# Patient Record
Sex: MALE | Race: OTHER | NOT HISPANIC OR LATINO | URBAN - METROPOLITAN AREA
[De-identification: names, ages, dates, MRNs, and addresses within clinical notes are randomized per-mention and may not be internally consistent; named-entity substitution may affect disease eponyms.]

---

## 2017-11-10 ENCOUNTER — INPATIENT (INPATIENT)
Facility: HOSPITAL | Age: 52
LOS: 1 days | Discharge: HOME | End: 2017-11-12
Attending: INTERNAL MEDICINE | Admitting: INTERNAL MEDICINE
Payer: COMMERCIAL

## 2017-11-10 VITALS
RESPIRATION RATE: 22 BRPM | OXYGEN SATURATION: 97 % | SYSTOLIC BLOOD PRESSURE: 180 MMHG | TEMPERATURE: 98 F | DIASTOLIC BLOOD PRESSURE: 93 MMHG | HEART RATE: 96 BPM

## 2017-11-10 DIAGNOSIS — J45.909 UNSPECIFIED ASTHMA, UNCOMPLICATED: ICD-10-CM

## 2017-11-10 LAB
ALBUMIN SERPL ELPH-MCNC: 4.4 G/DL — SIGNIFICANT CHANGE UP (ref 3.3–5)
ALP SERPL-CCNC: 82 U/L — SIGNIFICANT CHANGE UP (ref 40–120)
ALT FLD-CCNC: 48 U/L — HIGH (ref 4–41)
AST SERPL-CCNC: 64 U/L — HIGH (ref 4–40)
B PERT DNA SPEC QL NAA+PROBE: SIGNIFICANT CHANGE UP
BASE EXCESS BLDV CALC-SCNC: 2.7 MMOL/L — SIGNIFICANT CHANGE UP
BASOPHILS # BLD AUTO: 0.04 K/UL — SIGNIFICANT CHANGE UP (ref 0–0.2)
BASOPHILS NFR BLD AUTO: 0.3 % — SIGNIFICANT CHANGE UP (ref 0–2)
BILIRUB SERPL-MCNC: 0.4 MG/DL — SIGNIFICANT CHANGE UP (ref 0.2–1.2)
BLOOD GAS VENOUS - CREATININE: 0.86 MG/DL — SIGNIFICANT CHANGE UP (ref 0.5–1.3)
BUN SERPL-MCNC: 17 MG/DL — SIGNIFICANT CHANGE UP (ref 7–23)
C PNEUM DNA SPEC QL NAA+PROBE: NOT DETECTED — SIGNIFICANT CHANGE UP
CALCIUM SERPL-MCNC: 9.1 MG/DL — SIGNIFICANT CHANGE UP (ref 8.4–10.5)
CHLORIDE BLDV-SCNC: 108 MMOL/L — SIGNIFICANT CHANGE UP (ref 96–108)
CHLORIDE SERPL-SCNC: 105 MMOL/L — SIGNIFICANT CHANGE UP (ref 98–107)
CO2 SERPL-SCNC: 24 MMOL/L — SIGNIFICANT CHANGE UP (ref 22–31)
CREAT SERPL-MCNC: 0.97 MG/DL — SIGNIFICANT CHANGE UP (ref 0.5–1.3)
EOSINOPHIL # BLD AUTO: 0.56 K/UL — HIGH (ref 0–0.5)
EOSINOPHIL NFR BLD AUTO: 4.4 % — SIGNIFICANT CHANGE UP (ref 0–6)
FLUAV H1 2009 PAND RNA SPEC QL NAA+PROBE: NOT DETECTED — SIGNIFICANT CHANGE UP
FLUAV H1 RNA SPEC QL NAA+PROBE: NOT DETECTED — SIGNIFICANT CHANGE UP
FLUAV H3 RNA SPEC QL NAA+PROBE: NOT DETECTED — SIGNIFICANT CHANGE UP
FLUAV SUBTYP SPEC NAA+PROBE: SIGNIFICANT CHANGE UP
FLUBV RNA SPEC QL NAA+PROBE: NOT DETECTED — SIGNIFICANT CHANGE UP
GAS PNL BLDV: 139 MMOL/L — SIGNIFICANT CHANGE UP (ref 136–146)
GLUCOSE BLDV-MCNC: 111 — HIGH (ref 70–99)
GLUCOSE SERPL-MCNC: 111 MG/DL — HIGH (ref 70–99)
HADV DNA SPEC QL NAA+PROBE: NOT DETECTED — SIGNIFICANT CHANGE UP
HCO3 BLDV-SCNC: 26 MMOL/L — SIGNIFICANT CHANGE UP (ref 20–27)
HCOV 229E RNA SPEC QL NAA+PROBE: NOT DETECTED — SIGNIFICANT CHANGE UP
HCOV HKU1 RNA SPEC QL NAA+PROBE: NOT DETECTED — SIGNIFICANT CHANGE UP
HCOV NL63 RNA SPEC QL NAA+PROBE: NOT DETECTED — SIGNIFICANT CHANGE UP
HCOV OC43 RNA SPEC QL NAA+PROBE: NOT DETECTED — SIGNIFICANT CHANGE UP
HCT VFR BLD CALC: 41.7 % — SIGNIFICANT CHANGE UP (ref 39–50)
HCT VFR BLDV CALC: 44.8 % — SIGNIFICANT CHANGE UP (ref 39–51)
HGB BLD-MCNC: 13.9 G/DL — SIGNIFICANT CHANGE UP (ref 13–17)
HGB BLDV-MCNC: 14.6 G/DL — SIGNIFICANT CHANGE UP (ref 13–17)
HMPV RNA SPEC QL NAA+PROBE: NOT DETECTED — SIGNIFICANT CHANGE UP
HPIV1 RNA SPEC QL NAA+PROBE: NOT DETECTED — SIGNIFICANT CHANGE UP
HPIV2 RNA SPEC QL NAA+PROBE: NOT DETECTED — SIGNIFICANT CHANGE UP
HPIV3 RNA SPEC QL NAA+PROBE: NOT DETECTED — SIGNIFICANT CHANGE UP
HPIV4 RNA SPEC QL NAA+PROBE: NOT DETECTED — SIGNIFICANT CHANGE UP
IMM GRANULOCYTES # BLD AUTO: 0.06 # — SIGNIFICANT CHANGE UP
IMM GRANULOCYTES NFR BLD AUTO: 0.5 % — SIGNIFICANT CHANGE UP (ref 0–1.5)
LACTATE BLDV-MCNC: 1.1 MMOL/L — SIGNIFICANT CHANGE UP (ref 0.5–2)
LYMPHOCYTES # BLD AUTO: 18.7 % — SIGNIFICANT CHANGE UP (ref 13–44)
LYMPHOCYTES # BLD AUTO: 2.41 K/UL — SIGNIFICANT CHANGE UP (ref 1–3.3)
M PNEUMO DNA SPEC QL NAA+PROBE: NOT DETECTED — SIGNIFICANT CHANGE UP
MCHC RBC-ENTMCNC: 29.1 PG — SIGNIFICANT CHANGE UP (ref 27–34)
MCHC RBC-ENTMCNC: 33.3 % — SIGNIFICANT CHANGE UP (ref 32–36)
MCV RBC AUTO: 87.2 FL — SIGNIFICANT CHANGE UP (ref 80–100)
MONOCYTES # BLD AUTO: 0.98 K/UL — HIGH (ref 0–0.9)
MONOCYTES NFR BLD AUTO: 7.6 % — SIGNIFICANT CHANGE UP (ref 2–14)
NEUTROPHILS # BLD AUTO: 8.81 K/UL — HIGH (ref 1.8–7.4)
NEUTROPHILS NFR BLD AUTO: 68.5 % — SIGNIFICANT CHANGE UP (ref 43–77)
NRBC # FLD: 0 — SIGNIFICANT CHANGE UP
NT-PROBNP SERPL-SCNC: 31.35 PG/ML — SIGNIFICANT CHANGE UP
PCO2 BLDV: 52 MMHG — HIGH (ref 41–51)
PH BLDV: 7.35 PH — SIGNIFICANT CHANGE UP (ref 7.32–7.43)
PLATELET # BLD AUTO: 278 K/UL — SIGNIFICANT CHANGE UP (ref 150–400)
PMV BLD: 9.9 FL — SIGNIFICANT CHANGE UP (ref 7–13)
PO2 BLDV: 55 MMHG — HIGH (ref 35–40)
POTASSIUM BLDV-SCNC: 3.3 MMOL/L — LOW (ref 3.4–4.5)
POTASSIUM SERPL-MCNC: 3.5 MMOL/L — SIGNIFICANT CHANGE UP (ref 3.5–5.3)
POTASSIUM SERPL-SCNC: 3.5 MMOL/L — SIGNIFICANT CHANGE UP (ref 3.5–5.3)
PROT SERPL-MCNC: 7.4 G/DL — SIGNIFICANT CHANGE UP (ref 6–8.3)
RBC # BLD: 4.78 M/UL — SIGNIFICANT CHANGE UP (ref 4.2–5.8)
RBC # FLD: 13.3 % — SIGNIFICANT CHANGE UP (ref 10.3–14.5)
RSV RNA SPEC QL NAA+PROBE: NOT DETECTED — SIGNIFICANT CHANGE UP
RV+EV RNA SPEC QL NAA+PROBE: NOT DETECTED — SIGNIFICANT CHANGE UP
SAO2 % BLDV: 85.6 % — HIGH (ref 60–85)
SODIUM SERPL-SCNC: 143 MMOL/L — SIGNIFICANT CHANGE UP (ref 135–145)
WBC # BLD: 12.86 K/UL — HIGH (ref 3.8–10.5)
WBC # FLD AUTO: 12.86 K/UL — HIGH (ref 3.8–10.5)

## 2017-11-10 PROCEDURE — 71020: CPT | Mod: 26

## 2017-11-10 RX ORDER — IPRATROPIUM/ALBUTEROL SULFATE 18-103MCG
3 AEROSOL WITH ADAPTER (GRAM) INHALATION EVERY 6 HOURS
Qty: 0 | Refills: 0 | Status: DISCONTINUED | OUTPATIENT
Start: 2017-11-10 | End: 2017-11-12

## 2017-11-10 RX ORDER — IPRATROPIUM/ALBUTEROL SULFATE 18-103MCG
3 AEROSOL WITH ADAPTER (GRAM) INHALATION ONCE
Qty: 0 | Refills: 0 | Status: COMPLETED | OUTPATIENT
Start: 2017-11-10 | End: 2017-11-10

## 2017-11-10 RX ORDER — ALBUTEROL 90 UG/1
1 AEROSOL, METERED ORAL EVERY 4 HOURS
Qty: 0 | Refills: 0 | Status: DISCONTINUED | OUTPATIENT
Start: 2017-11-10 | End: 2017-11-10

## 2017-11-10 RX ORDER — ALBUTEROL 90 UG/1
2.5 AEROSOL, METERED ORAL ONCE
Qty: 0 | Refills: 0 | Status: COMPLETED | OUTPATIENT
Start: 2017-11-10 | End: 2017-11-10

## 2017-11-10 RX ORDER — LORATADINE 10 MG/1
10 TABLET ORAL DAILY
Qty: 0 | Refills: 0 | Status: DISCONTINUED | OUTPATIENT
Start: 2017-11-10 | End: 2017-11-12

## 2017-11-10 RX ORDER — MAGNESIUM SULFATE 500 MG/ML
2 VIAL (ML) INJECTION ONCE
Qty: 0 | Refills: 0 | Status: COMPLETED | OUTPATIENT
Start: 2017-11-10 | End: 2017-11-10

## 2017-11-10 RX ORDER — ALBUTEROL 90 UG/1
2 AEROSOL, METERED ORAL EVERY 4 HOURS
Qty: 0 | Refills: 0 | Status: DISCONTINUED | OUTPATIENT
Start: 2017-11-10 | End: 2017-11-12

## 2017-11-10 RX ORDER — ENOXAPARIN SODIUM 100 MG/ML
40 INJECTION SUBCUTANEOUS EVERY 24 HOURS
Qty: 0 | Refills: 0 | Status: DISCONTINUED | OUTPATIENT
Start: 2017-11-10 | End: 2017-11-12

## 2017-11-10 RX ORDER — SODIUM CHLORIDE 9 MG/ML
1000 INJECTION INTRAMUSCULAR; INTRAVENOUS; SUBCUTANEOUS ONCE
Qty: 0 | Refills: 0 | Status: COMPLETED | OUTPATIENT
Start: 2017-11-10 | End: 2017-11-10

## 2017-11-10 RX ADMIN — Medication 50 GRAM(S): at 03:53

## 2017-11-10 RX ADMIN — LORATADINE 10 MILLIGRAM(S): 10 TABLET ORAL at 15:11

## 2017-11-10 RX ADMIN — ENOXAPARIN SODIUM 40 MILLIGRAM(S): 100 INJECTION SUBCUTANEOUS at 15:11

## 2017-11-10 RX ADMIN — Medication 3 MILLILITER(S): at 11:48

## 2017-11-10 RX ADMIN — Medication 125 MILLIGRAM(S): at 06:17

## 2017-11-10 RX ADMIN — Medication 200 MILLIGRAM(S): at 21:55

## 2017-11-10 RX ADMIN — ALBUTEROL 2.5 MILLIGRAM(S): 90 AEROSOL, METERED ORAL at 06:17

## 2017-11-10 RX ADMIN — Medication 200 MILLIGRAM(S): at 15:11

## 2017-11-10 RX ADMIN — Medication 3 MILLILITER(S): at 03:53

## 2017-11-10 RX ADMIN — Medication 100 MILLIGRAM(S): at 06:17

## 2017-11-10 RX ADMIN — SODIUM CHLORIDE 1000 MILLILITER(S): 9 INJECTION INTRAMUSCULAR; INTRAVENOUS; SUBCUTANEOUS at 04:30

## 2017-11-10 RX ADMIN — Medication 100 MILLIGRAM(S): at 21:55

## 2017-11-10 RX ADMIN — ALBUTEROL 2 PUFF(S): 90 AEROSOL, METERED ORAL at 15:11

## 2017-11-10 RX ADMIN — Medication 3 MILLILITER(S): at 18:54

## 2017-11-10 RX ADMIN — Medication 3 MILLILITER(S): at 23:56

## 2017-11-10 NOTE — ED PROVIDER NOTE - ATTENDING CONTRIBUTION TO CARE
[place]: 52 yom with exercise induced asthma c/o SOb for 10 days. seen in urgent care twice and given antibxs and steroids with no improvement.  No fever, +cough, no sputum, no cp, sob worse with exertion,, no travel, no leg swelling, no sore throat.  On exam, pt is well appearing, +mild to mod resp distress, diffuse wheeze, not tachy, 97% RA, 91% with exertion, abd soft, no pitting edema, no calf tn, cxr unremarkable. sxs improved with nebs and steroids and mag but short lived.  PCU admit.

## 2017-11-10 NOTE — ED ADULT NURSE NOTE - OBJECTIVE STATEMENT
break coverage-pt received spot 12, alert and orientedx3 c.o sob and wheezing x a few months, worsening this week. was seen at [facility] with no relief. denies cp dizziness nausea. currently on NRB, respirations labored. iv placed, labs sent. md at bedside. will monitor.

## 2017-11-10 NOTE — ED PROVIDER NOTE - MEDICAL DECISION MAKING DETAILS
53 y/o M with PMH seasonal allergies p/w SOB and wheezing. likely asthma exacerbation w/ prolonged expiratory phase. cbc, cmp, cxr, duonebs, mag, rvp, vbg.

## 2017-11-10 NOTE — ED ADULT NURSE REASSESSMENT NOTE - NS ED NURSE REASSESS COMMENT FT1
Pt. vital signs as stated. Pt. is resting comfortably. Denies chest pain, n/v, dizziness, lightheadedness. c/o occasional cough that brings on SOB. Awaiting bed in PCU. Sating at 98% on 3l NC. Will continue to monitor.

## 2017-11-10 NOTE — H&P ADULT - ASSESSMENT
----------------------------------------  Ann-Marie Thrasher MD PGY-4  Pulmonary/Critical Care Fellow  Pager # [phone] (NS), [phone] ([facility]) 52M hx seasonal allergies, Vit D deficiency and herniated disc, who p/w SOB and cough for past 10 days.    #SOB/Cough  Pt c/o SOB/nonproductive cough for the past 10 days. Initially started with subjective fevers, which resolved with OTC NSAID/tylenol. Family has been sick as well. RVP negative now though may have been infected over a week ago. Has been getting steroids as outpatient but inappropriate dosages and being treated as PNA though no radiological evidence of PNA. On labs has mild leukocytosis, which is likely secondary to steroid use, and also found to have CO2 52 with mild hypoxia. Currently improved after nebulizer treatments and solumedrol. No history of asthma, so hesitant to call this asthma exacerbation. Most likely Reactive Airway Disease from probable viral URI 10 days ago with lingering bronchospasms causing wheezing, SOB, and cough.  - duonebs q6h, albuterol prn  - prednisone 40 mg qd   - consider starting symbicort, though pt does not carry asthma diagnosis  - antitussives: tessalon perles, guaifenesin  - no need for antibiotics at this time  - will need outpatient PFTs  - monitor O2 Sat, maintain > 90%  - repeat ABG tomorrow, can get bilevel if increased WOB but no need at this time  - f/u CBC  - low threshold to culture     #ppx  - DVT: Lovenox  - home when medically stable for discharge    Case to be d/w attending    ----------------------------------------  Ann-Marie Thrasher MD PGY-4  Pulmonary/Critical Care Fellow  Pager # [phone] (NS), [phone] ([facility])

## 2017-11-10 NOTE — H&P ADULT - NSHPSOCIALHISTORY_GEN_ALL_CORE
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

## 2017-11-10 NOTE — ED PROVIDER NOTE - OBJECTIVE STATEMENT
51 y/o M with PMH seasonal allergies p/w SOB and wheezing. PT states he started with a cough on nov 2nd and was started on erythromycin.  Pt. was recently seen in urgent care for similar symptoms was given prednisone which he completed but continued to have symptoms. yesterday he was seen by urgent care and was started on augmentin and , methylprednisone which he took  24mg today, and albuterol. He states he went home and has continued to have SOB and nonproductive cough. He states he currently has SOB worse with exertion and wheezing. He states his children and sick with bronchitis. he denies current fever, chest pain, diarrhea, dysuria, recent travel

## 2017-11-10 NOTE — H&P ADULT - NSHPPHYSICALEXAM_GEN_ALL_CORE
General: NAD, pleasant  HEENT: NCAT, moist mucosal membranes; no JVD appreciated  Resp: mild insp wheezing, speaking in short sentences, no accessory muscle  Cards: S1/S2 normal, RRR, no murmurs/rubs/gallops appreciated  Abd: soft, non-tender, non-distended with normal bowel sounds  Ext: no b/l LE edema  Skin: warm/dry  Neuro: AAOx3, no focal deficits

## 2017-11-10 NOTE — H&P ADULT - HISTORY OF PRESENT ILLNESS
52M hx seasonal allergies, Vit D deficiency and herniated disc, who p/w SOB and cough for past 10 days. 2 weeks ago was feeling in his usual state of health.  States that his family has been sick over the last week with bronchitis and viral URIs.     In the ED, VS Tm 98, -180/, HR 83-96, RR 20-22, O2 Sat 98% on 2L NC. Given nebulizer x1, 1L NS, duonebs x3, solumedrol 125 mg IV x1, and Mag 52M hx seasonal allergies, Vit D deficiency and herniated disc, who p/w SOB and cough for past 10 days. Two weeks ago was feeling in his usual state of health but then developed a subjective fever, which resolved with tylenol and motrin. However patient then developed non-productive cough with SOB/CASTILLO. Over the past 10 days has seen 3 doctors. On 11/2, he went to urgent care, where they gave him erythromycin and albuterol inhaler, which pt states was not helpful (though found his son's albuterol nebulizer helpful for his SOB). Then returned to urgent care on 11/6 where he was given a brief prednisone taper of (30 --> 20 --> 10) and another albuterol inhaler. After his last prednisone dose on 11/9, he continued to be symptomatic and went to clinic on 11/9, where CXR was done without evidence of pneumonia. In the clinic, he was given a nebulizer treatment and prescribed meylprednisolone dose pack and augmentin bid x 10 days. Pt has only taken 24 mg of methylprednisolone so far but has required albuterol multiple times for symptomatic relief. Denies chills, chest pain, abdominal pain, nauysea/vomiting, diarrhea, hemoptysis, weight loss, night sweats, b/l LE edema, sore throat, rhinorrhea, nasal congestion, itchy throat. States that his family has been sick over the last week with bronchitis and viral URIs. Patient has no history of asthma. Used to smoke 10-12 cig/day x 10 years but quit in 1988. 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 Because of persistent symptoms, he came to the ED.     In the ED, VS Tm 98, -180/, HR 83-96, RR 20-22, O2 Sat 98% on 2L NC. Given nebulizer x1, 1L NS, duonebs x3, solumedrol 125 mg IV x1, and Magnesium IV. Pt is feeling that symptoms have improved somewhat.

## 2017-11-10 NOTE — ED ADULT NURSE NOTE - CHIEF COMPLAINT QUOTE
Pt arrives to ER c/o wheezing and SOB. Pt was seen [facility] for [facility] on [facility]/4 and 11/6 and states he received steroids but they didn't work. Today he was seen at the clinic for same and received an Rx for methylprednisolone and Amox/Xjpy695/125 of which he took 1 dose but states SOB continues and the medication is not working. Pt c/o pain only during coughing.

## 2017-11-10 NOTE — ED ADULT TRIAGE NOTE - CHIEF COMPLAINT QUOTE
Pt arrives to ER c/o wheezing and SOB. Pt was seen at [facility] for [facility] on 11/4 and 11/6 and states he received steroids but they didn't work. Today he was seen at the clinic for same and received an Rx for methylprednisolone and Amox/Opur002/125 of which he took 1 dose but states SOB continues and the medication is not working. Pt c/o pain only during coughing.

## 2017-11-10 NOTE — ED ADULT NURSE REASSESSMENT NOTE - NS ED NURSE REASSESS COMMENT FT1
Pt. vital signs as stated. Pt. appears in NAD, received nebulizer tx, breathing even and unlabored. Awaiting bed. Will continue to monitor.

## 2017-11-10 NOTE — H&P ADULT - NSHPLABSRESULTS_GEN_ALL_CORE
13.9   12.86 )-----------( 278      ( 10 Nov 2017 03:40 )             41.7     Hgb Trend: 13.9<--  11-10    143  |  105  |  17  ----------------------------<  111<H>  3.5   |  24  |  0.97    Ca    9.1      10 Nov 2017 03:40    TPro  7.4  /  Alb  4.4  /  TBili  0.4  /  DBili  x   /  AST  64<H>  /  ALT  48<H>  /  AlkPhos  82  11-10    Creatinine Trend: 0.97<--      Venous Blood Gas:  11-10 @ 03:40  7.35/52/55/26/85.6  VBG Lactate: 1.1      MICROBIOLOGY:   RVP neg    RADIOLOGY:  CXR: clear

## 2017-11-10 NOTE — ED ADULT NURSE REASSESSMENT NOTE - NS ED NURSE REASSESS COMMENT FT1
pt medicated for SOB- pt now comfortable sleeping in bed Spo2 98% on 2L o2 nasal cannula- pt in NAD will continue to monitor.

## 2017-11-11 ENCOUNTER — TRANSCRIPTION ENCOUNTER (OUTPATIENT)
Age: 52
End: 2017-11-11

## 2017-11-11 DIAGNOSIS — R06.02 SHORTNESS OF BREATH: ICD-10-CM

## 2017-11-11 DIAGNOSIS — Z29.9 ENCOUNTER FOR PROPHYLACTIC MEASURES, UNSPECIFIED: ICD-10-CM

## 2017-11-11 LAB
BASOPHILS # BLD AUTO: 0.03 K/UL — SIGNIFICANT CHANGE UP (ref 0–0.2)
BASOPHILS NFR BLD AUTO: 0.2 % — SIGNIFICANT CHANGE UP (ref 0–2)
BUN SERPL-MCNC: 17 MG/DL — SIGNIFICANT CHANGE UP (ref 7–23)
CALCIUM SERPL-MCNC: 8.9 MG/DL — SIGNIFICANT CHANGE UP (ref 8.4–10.5)
CHLORIDE SERPL-SCNC: 103 MMOL/L — SIGNIFICANT CHANGE UP (ref 98–107)
CO2 SERPL-SCNC: 27 MMOL/L — SIGNIFICANT CHANGE UP (ref 22–31)
CREAT SERPL-MCNC: 0.91 MG/DL — SIGNIFICANT CHANGE UP (ref 0.5–1.3)
EOSINOPHIL # BLD AUTO: 0.05 K/UL — SIGNIFICANT CHANGE UP (ref 0–0.5)
EOSINOPHIL NFR BLD AUTO: 0.3 % — SIGNIFICANT CHANGE UP (ref 0–6)
GLUCOSE SERPL-MCNC: 113 MG/DL — HIGH (ref 70–99)
HCT VFR BLD CALC: 39.5 % — SIGNIFICANT CHANGE UP (ref 39–50)
HGB BLD-MCNC: 13.2 G/DL — SIGNIFICANT CHANGE UP (ref 13–17)
IMM GRANULOCYTES # BLD AUTO: 0.05 # — SIGNIFICANT CHANGE UP
IMM GRANULOCYTES NFR BLD AUTO: 0.3 % — SIGNIFICANT CHANGE UP (ref 0–1.5)
LYMPHOCYTES # BLD AUTO: 17.5 % — SIGNIFICANT CHANGE UP (ref 13–44)
LYMPHOCYTES # BLD AUTO: 2.59 K/UL — SIGNIFICANT CHANGE UP (ref 1–3.3)
MCHC RBC-ENTMCNC: 29.1 PG — SIGNIFICANT CHANGE UP (ref 27–34)
MCHC RBC-ENTMCNC: 33.4 % — SIGNIFICANT CHANGE UP (ref 32–36)
MCV RBC AUTO: 87.2 FL — SIGNIFICANT CHANGE UP (ref 80–100)
MONOCYTES # BLD AUTO: 1.25 K/UL — HIGH (ref 0–0.9)
MONOCYTES NFR BLD AUTO: 8.4 % — SIGNIFICANT CHANGE UP (ref 2–14)
NEUTROPHILS # BLD AUTO: 10.84 K/UL — HIGH (ref 1.8–7.4)
NEUTROPHILS NFR BLD AUTO: 73.3 % — SIGNIFICANT CHANGE UP (ref 43–77)
NRBC # FLD: 0 — SIGNIFICANT CHANGE UP
PLATELET # BLD AUTO: 260 K/UL — SIGNIFICANT CHANGE UP (ref 150–400)
PMV BLD: 9.8 FL — SIGNIFICANT CHANGE UP (ref 7–13)
POTASSIUM SERPL-MCNC: 3.6 MMOL/L — SIGNIFICANT CHANGE UP (ref 3.5–5.3)
POTASSIUM SERPL-SCNC: 3.6 MMOL/L — SIGNIFICANT CHANGE UP (ref 3.5–5.3)
RBC # BLD: 4.53 M/UL — SIGNIFICANT CHANGE UP (ref 4.2–5.8)
RBC # FLD: 13.4 % — SIGNIFICANT CHANGE UP (ref 10.3–14.5)
SODIUM SERPL-SCNC: 140 MMOL/L — SIGNIFICANT CHANGE UP (ref 135–145)
WBC # BLD: 14.81 K/UL — HIGH (ref 3.8–10.5)
WBC # FLD AUTO: 14.81 K/UL — HIGH (ref 3.8–10.5)

## 2017-11-11 PROCEDURE — 99223 1ST HOSP IP/OBS HIGH 75: CPT | Mod: GC

## 2017-11-11 RX ORDER — BUDESONIDE, MICRONIZED 100 %
1 POWDER (GRAM) MISCELLANEOUS
Qty: 1 | Refills: 0 | OUTPATIENT
Start: 2017-11-11 | End: 2017-12-11

## 2017-11-11 RX ORDER — BUDESONIDE, MICRONIZED 100 %
1 POWDER (GRAM) MISCELLANEOUS
Qty: 0 | Refills: 0 | Status: DISCONTINUED | OUTPATIENT
Start: 2017-11-11 | End: 2017-11-12

## 2017-11-11 RX ORDER — ALBUTEROL 90 UG/1
2 AEROSOL, METERED ORAL
Qty: 1 | Refills: 0 | OUTPATIENT
Start: 2017-11-11 | End: 2017-12-11

## 2017-11-11 RX ORDER — LORATADINE 10 MG/1
1 TABLET ORAL
Qty: 30 | Refills: 0 | OUTPATIENT
Start: 2017-11-11 | End: 2017-12-11

## 2017-11-11 RX ADMIN — Medication 200 MILLIGRAM(S): at 21:54

## 2017-11-11 RX ADMIN — Medication 40 MILLIGRAM(S): at 05:42

## 2017-11-11 RX ADMIN — Medication 200 MILLIGRAM(S): at 05:42

## 2017-11-11 RX ADMIN — Medication 3 MILLILITER(S): at 16:26

## 2017-11-11 RX ADMIN — LORATADINE 10 MILLIGRAM(S): 10 TABLET ORAL at 12:03

## 2017-11-11 RX ADMIN — Medication 200 MILLIGRAM(S): at 12:07

## 2017-11-11 RX ADMIN — Medication 3 MILLILITER(S): at 09:51

## 2017-11-11 RX ADMIN — Medication 100 MILLIGRAM(S): at 21:54

## 2017-11-11 RX ADMIN — Medication 100 MILLIGRAM(S): at 05:42

## 2017-11-11 RX ADMIN — Medication 3 MILLILITER(S): at 22:31

## 2017-11-11 NOTE — DISCHARGE NOTE ADULT - MEDICATION SUMMARY - MEDICATIONS TO STOP TAKING
I will STOP taking the medications listed below when I get home from the hospital:    Augmentin 875 mg-125 mg oral tablet  -- 1 tab(s) by mouth every 12 hours x 10 days total    MethylPREDNISolone Dose Pack 4 mg oral tablet

## 2017-11-11 NOTE — DISCHARGE NOTE ADULT - MEDICATION SUMMARY - MEDICATIONS TO TAKE
I will START or STAY ON the medications listed below when I get home from the hospital:    budesonide 90 mcg/inh inhalation powder  -- 1 puff(s) inhaled 2 times a day   -- Indication: For Shortness of breath    predniSONE 10 mg oral tablet  -- 4 tab(s) PO QD x 2 days  3 tab(s) by mouth once a day x 2 days  2 tab(s) by mouth once a day x 2 days  1 tab(s) by mouth once a day x 2 day  -- It is very important that you take or use this exactly as directed.  Do not skip doses or discontinue unless directed by your doctor.  Obtain medical advice before taking any non-prescription drugs as some may affect the action of this medication.  Take with food or milk.    -- Indication: For Shortness of breath    loratadine 10 mg oral tablet  -- 1 tab(s) by mouth once a day  -- Indication: For Seasonal allergies    benzonatate 100 mg oral capsule  -- 1 cap(s) by mouth 3 times a day, As needed, Cough  -- Indication: For Shortness of breath    Ventolin HFA 90 mcg/inh inhalation aerosol  -- 2 puff(s) inhaled 4 times a day, As Needed -for shortness of breath and/or wheezing   -- Indication: For Shortness of breath    guaiFENesin 100 mg/5 mL oral liquid  -- 10 milliliter(s) by mouth every 6 hours, As needed, Cough  -- Indication: For Shortness of breath    Coenzyme Q10 10 mg oral capsule  -- Indication: For Vitamins    Vitamin D3 1000 intl units oral tablet  -- 1 tab(s) by mouth once a day  -- Indication: For Vitamins I will START or STAY ON the medications listed below when I get home from the hospital:    budesonide 90 mcg/inh inhalation powder  -- 1 puff(s) inhaled 2 times a day   -- Indication: For Shortness of breath    predniSONE 10 mg oral tablet  -- 3 tab(s) by mouth once a day x 3 days  2 tab(s) by mouth once a day x 3 days  1 tab(s) by mouth once a day x 3 da  -- It is very important that you take or use this exactly as directed.  Do not skip doses or discontinue unless directed by your doctor.  Obtain medical advice before taking any non-prescription drugs as some may affect the action of this medication.  Take with food or milk.    -- Indication: For Shortness of breath    loratadine 10 mg oral tablet  -- 1 tab(s) by mouth once a day  -- Indication: For Seasonal allergies    benzonatate 100 mg oral capsule  -- 1 cap(s) by mouth 3 times a day, As needed, Cough  -- Indication: For Shortness of breath    Ventolin HFA 90 mcg/inh inhalation aerosol  -- 2 puff(s) inhaled 4 times a day, As Needed -for shortness of breath and/or wheezing   -- Indication: For Shortness of breath    guaiFENesin 100 mg/5 mL oral liquid  -- 10 milliliter(s) by mouth every 6 hours, As needed, Cough  -- Indication: For Shortness of breath    Coenzyme Q10 10 mg oral capsule  -- Indication: For Vitamins    pantoprazole 40 mg oral delayed release tablet  -- 1 tab(s) by mouth once a day (before a meal)  -- Indication: For Gerd     Vitamin D3 1000 intl units oral tablet  -- 1 tab(s) by mouth once a day  -- Indication: For Vitamins

## 2017-11-11 NOTE — PROGRESS NOTE ADULT - ASSESSMENT
52M hx seasonal allergies, Vit D deficiency and herniated disc, who p/w SOB and cough for past 10 days.    #SOB/Cough  Pt c/o SOB/nonproductive cough for the past 10 days. Initially started with subjective fevers, which resolved with OTC NSAID/tylenol. Family has been sick as well. RVP negative now though may have been infected over a week ago. Has been getting steroids as outpatient but inappropriate dosages and being treated as PNA though no radiological evidence of PNA. On labs has mild leukocytosis, which is likely secondary to steroid use, and also found to have CO2 52 with mild hypoxia. Currently improved after nebulizer treatments and solumedrol. No history of asthma, so hesitant to call this asthma exacerbation. Most likely Reactive Airway Disease from probable viral URI 10 days ago with lingering bronchospasms causing wheezing, SOB, and cough.  - duonebs q6h, albuterol prn  - prednisone 40 mg qd   - consider starting symbicort, though pt does not carry asthma diagnosis  - antitussives: tessalon perles, guaifenesin  - no need for antibiotics at this time  - will need outpatient PFTs  - monitor O2 Sat, maintain > 90%  - repeat ABG tomorrow, can get bilevel if increased WOB but no need at this time  - f/u CBC  - low threshold to culture     #ppx  - DVT: Lovenox  - home when medically stable for discharge    Case to be d/w attending    ----------------------------------------  Ann-Marie Thrasher MD PGY-4  Pulmonary/Critical Care Fellow  Pager # [phone] (NS), [phone] ([facility]) 52M hx seasonal allergies, Vit D deficiency and herniated disc, who p/w SOB and cough for past 10 days. 52M hx environmental allergies (for which he receives injections), Vit D deficiency and herniated disc, who p/w SOB and cough for past 10 days - likely post infectious cough with a component of bronchospasm/reactive airways disease with history of "Asthma" diagnosed more than a decade ago without acute exacerbation until this past 2 weeks.

## 2017-11-11 NOTE — PROGRESS NOTE ADULT - PROBLEM SELECTOR PLAN 1
- likely 2/2 reactive airway disease, pt without PFTs to confirm dx of asthma  - off oxygen at this time and tolerating room air  - would continue with albuterol  - cont with claritin  - cont pred, with short taper upon discharge  - add pulmicort  - will need outpt follow up for PFTs   - monitor respiratory status

## 2017-11-11 NOTE — DISCHARGE NOTE ADULT - CARE PLAN
Principal Discharge DX:	Shortness of breath  Goal:	resolution of symptoms, management  Instructions for follow-up, activity and diet:	Your shortness of breath has improved. Your chest xray was clear.   Please continue with medications as directed.  Please follow up with pulmonology office for hospital follow up within 2-3 weeks of discharge - you need pulmonary function tests (PFTs) and further follow up.  Please call the office for an appointment. Principal Discharge DX:	Shortness of breath  Goal:	resolution of symptoms, management  Instructions for follow-up, activity and diet:	Your shortness of breath has improved. Your chest x-ray was clear.   Please continue with medications as directed.  Please follow up with pulmonology office for hospital follow up within 2-3 weeks of discharge - you need pulmonary function tests (PFTs) and further follow up.  Please call the office for an appointment.

## 2017-11-11 NOTE — PROGRESS NOTE ADULT - SUBJECTIVE AND OBJECTIVE BOX
CHIEF COMPLAINT: Patient is a 52y old  Male who presents with a chief complaint of SOB, cough (10 Nov 2017 11:01)    Interval Events:      REVIEW OF SYSTEMS:  Constitutional:   Eyes:  ENT:  CV:  Resp:  GI:  :  MSK:  Integumentary:  Neurological:  Psychiatric:  Endocrine:  Hematologic/Lymphatic:  Allergic/Immunologic:  [ ] All other systems negative  [ ] Unable to assess ROS because ________      OBJECTIVE:  ICU Vital Signs Last 24 Hrs  T(C): 36.9 (11 Nov 2017 05:40), Max: 36.9 (11 Nov 2017 05:40)  T(F): 98.4 (11 Nov 2017 05:40), Max: 98.4 (11 Nov 2017 05:40)  HR: 88 (11 Nov 2017 05:40) (78 - 97)  BP: 123/79 (11 Nov 2017 05:40) (123/79 - 139/80)  BP(mean): --  ABP: --  ABP(mean): --  RR: 18 (11 Nov 2017 05:40) (17 - 18)  SpO2: 97% (11 Nov 2017 05:40) (95% - 100%)      HOSPITAL MEDICATIONS:  MEDICATIONS  (STANDING):  ALBUTerol/ipratropium for Nebulization 3 milliLiter(s) Nebulizer every 6 hours  enoxaparin Injectable 40 milliGRAM(s) SubCutaneous every 24 hours  loratadine 10 milliGRAM(s) Oral daily  predniSONE   Tablet 40 milliGRAM(s) Oral daily    MEDICATIONS  (PRN):  ALBUTerol    90 MICROgram(s) HFA Inhaler 2 Puff(s) Inhalation every 4 hours PRN Shortness of Breath and/or Wheezing  benzonatate 100 milliGRAM(s) Oral three times a day PRN Cough  guaiFENesin    Syrup 200 milliGRAM(s) Oral every 6 hours PRN Cough      LABS:                        13.2   14.81 )-----------( 260      ( 11 Nov 2017 05:40 )             39.5     11-11    140  |  103  |  17  ----------------------------<  113<H>  3.6   |  27  |  0.91    Ca    8.9      11 Nov 2017 05:40    TPro  7.4  /  Alb  4.4  /  TBili  0.4  /  DBili  x   /  AST  64<H>  /  ALT  48<H>  /  AlkPhos  82  11-10      Venous Blood Gas:  11-10 @ 03:40  7.35/52/55/26/85.6  VBG Lactate: 1.1      Rapid Respiratory Viral Panel (11.10.17 @ 03:40)    Adenovirus (RapRVP): NOT DETECTED    Influenza A (RapRVP): NOT DETECTED (any subtype)    Influenza AH1 2009 (RapRVP): NOT DETECTED    Influenza AH1 (RapRVP): NOT DETECTED    Influenza AH3 (RapRVP): NOT DETECTED    Influenza B (RapRVP): NOT DETECTED    Parainfluenza 1 (RapRVP): NOT DETECTED    Parainfluenza 2 (RapRVP): NOT DETECTED    Parainfluenza 3 (RapRVP): NOT DETECTED    Parainfluenza 4 (RapRVP): NOT DETECTED    Resp Syncytial Virus (RapRVP): NOT DETECTED    Bordetella pertussis (RapRVP): NOT DETECTED    Chlamydia pneumoniae (RapRVP): NOT DETECTED    Mycoplasma pneumoniae (RapRVP): NOT DETECTED This nucleic acid amplification assay was performed using  the [employer] Film[employer]. The following pathogens are tested  for: Adenovirus, Coronavirus 229E, Coronavirus HKU1,  Coronavirus NL63, Coronavirus OC43, Human Metapneumovirus  (HMPV), Rhinovirus/Enterovirus, Influenza A H1, Influenza A  H1 2009 (Pandemic H1 2009), Influenza A H3, Influenza A (Flu  A) subtype not identified, Influenza B, Parainfluenza Virus  (types 1, 2, 3, 4), Respiratory Syncytial Virus (RSV),  Bordetella pertussis, Chlamydophila pneumoniae, and  Mycoplasma pneumoniae. A negative FilmArray result does not  always exclude the possibility of viral or bacterial  infection. Laboratory results should always be interpreted  in the context of clinical findings.    Entero/Rhinovirus (RapRVP): NOT DETECTED    HKU1 Coronavirus (RapRVP): NOT DETECTED    NL63 Coronavirus (RapRVP): NOT DETECTED    229E Coronavirus (RapRVP): NOT DETECTED    OC43 Coronavirus (RapRVP): NOT DETECTED    hMPV (RapRVP): NOT DETECTED CHIEF COMPLAINT: Patient is a 52y old  Male who presents with a chief complaint of SOB, cough (10 Nov 2017 11:01)    Interval Events: admitted overnight      REVIEW OF SYSTEMS:  CV: denies  Resp: denies  GI: denies  [x All other systems negative  [ ] Unable to assess ROS because ________      OBJECTIVE:  ICU Vital Signs Last 24 Hrs  T(C): 36.9 (11 Nov 2017 05:40), Max: 36.9 (11 Nov 2017 05:40)  T(F): 98.4 (11 Nov 2017 05:40), Max: 98.4 (11 Nov 2017 05:40)  HR: 88 (11 Nov 2017 05:40) (78 - 97)  BP: 123/79 (11 Nov 2017 05:40) (123/79 - 139/80)  BP(mean): --  ABP: --  ABP(mean): --  RR: 18 (11 Nov 2017 05:40) (17 - 18)  SpO2: 97% (11 Nov 2017 05:40) (95% - 100%)      HOSPITAL MEDICATIONS:  MEDICATIONS  (STANDING):  ALBUTerol/ipratropium for Nebulization 3 milliLiter(s) Nebulizer every 6 hours  enoxaparin Injectable 40 milliGRAM(s) SubCutaneous every 24 hours  loratadine 10 milliGRAM(s) Oral daily  predniSONE   Tablet 40 milliGRAM(s) Oral daily    MEDICATIONS  (PRN):  ALBUTerol    90 MICROgram(s) HFA Inhaler 2 Puff(s) Inhalation every 4 hours PRN Shortness of Breath and/or Wheezing  benzonatate 100 milliGRAM(s) Oral three times a day PRN Cough  guaiFENesin    Syrup 200 milliGRAM(s) Oral every 6 hours PRN Cough      LABS:                        13.2   14.81 )-----------( 260      ( 11 Nov 2017 05:40 )             39.5     11-11    140  |  103  |  17  ----------------------------<  113<H>  3.6   |  27  |  0.91    Ca    8.9      11 Nov 2017 05:40    TPro  7.4  /  Alb  4.4  /  TBili  0.4  /  DBili  x   /  AST  64<H>  /  ALT  48<H>  /  AlkPhos  82  11-10      Venous Blood Gas:  11-10 @ 03:40  7.35/52/55/26/85.6  VBG Lactate: 1.1      Rapid Respiratory Viral Panel (11.10.17 @ 03:40)    Adenovirus (RapRVP): NOT DETECTED    Influenza A (RapRVP): NOT DETECTED (any subtype)    Influenza AH1 2009 (RapRVP): NOT DETECTED    Influenza AH1 (RapRVP): NOT DETECTED    Influenza AH3 (RapRVP): NOT DETECTED    Influenza B (RapRVP): NOT DETECTED    Parainfluenza 1 (RapRVP): NOT DETECTED    Parainfluenza 2 (RapRVP): NOT DETECTED    Parainfluenza 3 (RapRVP): NOT DETECTED    Parainfluenza 4 (RapRVP): NOT DETECTED    Resp Syncytial Virus (RapRVP): NOT DETECTED    Bordetella pertussis (RapRVP): NOT DETECTED    Chlamydia pneumoniae (RapRVP): NOT DETECTED    Mycoplasma pneumoniae (RapRVP): NOT DETECTED This nucleic acid amplification assay was performed using  the [employer]. The following pathogens are tested  for: Adenovirus, Coronavirus 229E, Coronavirus HKU1,  Coronavirus NL63, Coronavirus OC43, Human Metapneumovirus  (HMPV), Rhinovirus/Enterovirus, Influenza A H1, Influenza A  H1 2009 (Pandemic H1 2009), Influenza A H3, Influenza A (Flu  A) subtype not identified, Influenza B, Parainfluenza Virus  (types 1, 2, 3, 4), Respiratory Syncytial Virus (RSV),  Bordetella pertussis, Chlamydophila pneumoniae, and  Mycoplasma pneumoniae. A negative FilmArray result does not  always exclude the possibility of viral or bacterial  infection. Laboratory results should always be interpreted  in the context of clinical findings.    Entero/Rhinovirus (RapRVP): NOT DETECTED    HKU1 Coronavirus (RapRVP): NOT DETECTED    NL63 Coronavirus (RapRVP): NOT DETECTED    229E Coronavirus (RapRVP): NOT DETECTED    OC43 Coronavirus (RapRVP): NOT DETECTED    hMPV (RapRVP): NOT DETECTED CHIEF COMPLAINT: Patient is a 52y old  Male who presents with a chief complaint of SOB, cough (10 Nov 2017 11:01)    Interval Events: admitted overnight- states SOB, cough and wheezing much improved, ambulating around the medical floor with very mild CASTILLO      REVIEW OF SYSTEMS:  CV: denies  Resp: denies  GI: denies  [x All other systems negative  [ ] Unable to assess ROS because ________      OBJECTIVE:  ICU Vital Signs Last 24 Hrs  T(C): 36.9 (11 Nov 2017 05:40), Max: 36.9 (11 Nov 2017 05:40)  T(F): 98.4 (11 Nov 2017 05:40), Max: 98.4 (11 Nov 2017 05:40)  HR: 88 (11 Nov 2017 05:40) (78 - 97)  BP: 123/79 (11 Nov 2017 05:40) (123/79 - 139/80)  BP(mean): --  ABP: --  ABP(mean): --  RR: 18 (11 Nov 2017 05:40) (17 - 18)  SpO2: 97% (11 Nov 2017 05:40) (95% - 100%)      HOSPITAL MEDICATIONS:  MEDICATIONS  (STANDING):  ALBUTerol/ipratropium for Nebulization 3 milliLiter(s) Nebulizer every 6 hours  enoxaparin Injectable 40 milliGRAM(s) SubCutaneous every 24 hours  loratadine 10 milliGRAM(s) Oral daily  predniSONE   Tablet 40 milliGRAM(s) Oral daily    MEDICATIONS  (PRN):  ALBUTerol    90 MICROgram(s) HFA Inhaler 2 Puff(s) Inhalation every 4 hours PRN Shortness of Breath and/or Wheezing  benzonatate 100 milliGRAM(s) Oral three times a day PRN Cough  guaiFENesin    Syrup 200 milliGRAM(s) Oral every 6 hours PRN Cough      LABS:                        13.2   14.81 )-----------( 260      ( 11 Nov 2017 05:40 )             39.5     11-11    140  |  103  |  17  ----------------------------<  113<H>  3.6   |  27  |  0.91    Ca    8.9      11 Nov 2017 05:40    TPro  7.4  /  Alb  4.4  /  TBili  0.4  /  DBili  x   /  AST  64<H>  /  ALT  48<H>  /  AlkPhos  82  11-10      Venous Blood Gas:  11-10 @ 03:40  7.35/52/55/26/85.6  VBG Lactate: 1.1      Rapid Respiratory Viral Panel (11.10.17 @ 03:40)    Adenovirus (RapRVP): NOT DETECTED    Influenza A (RapRVP): NOT DETECTED (any subtype)    Influenza AH1 2009 (RapRVP): NOT DETECTED    Influenza AH1 (RapRVP): NOT DETECTED    Influenza AH3 (RapRVP): NOT DETECTED    Influenza B (RapRVP): NOT DETECTED    Parainfluenza 1 (RapRVP): NOT DETECTED    Parainfluenza 2 (RapRVP): NOT DETECTED    Parainfluenza 3 (RapRVP): NOT DETECTED    Parainfluenza 4 (RapRVP): NOT DETECTED    Resp Syncytial Virus (RapRVP): NOT DETECTED    Bordetella pertussis (RapRVP): NOT DETECTED    Chlamydia pneumoniae (RapRVP): NOT DETECTED    Mycoplasma pneumoniae (RapRVP): NOT DETECTED This nucleic acid amplification assay was performed using  the [employer] FilmArray. The following pathogens are tested  for: Adenovirus, Coronavirus 229E, Coronavirus HKU1,  Coronavirus NL63, Coronavirus OC43, Human Metapneumovirus  (HMPV), Rhinovirus/Enterovirus, Influenza A H1, Influenza A  H1 2009 (Pandemic H1 2009), Influenza A H3, Influenza A (Flu  A) subtype not identified, Influenza B, Parainfluenza Virus  (types 1, 2, 3, 4), Respiratory Syncytial Virus (RSV),  Bordetella pertussis, Chlamydophila pneumoniae, and  Mycoplasma pneumoniae. A negative FilmArray result does not  always exclude the possibility of viral or bacterial  infection. Laboratory results should always be interpreted  in the context of clinical findings.    Entero/Rhinovirus (RapRVP): NOT DETECTED    HKU1 Coronavirus (RapRVP): NOT DETECTED    NL63 Coronavirus (RapRVP): NOT DETECTED    229E Coronavirus (RapRVP): NOT DETECTED    OC43 Coronavirus (RapRVP): NOT DETECTED    hMPV (RapRVP): NOT DETECTED

## 2017-11-11 NOTE — DISCHARGE NOTE ADULT - PLAN OF CARE
resolution of symptoms, management Your shortness of breath has improved. Your chest xray was clear.   Please continue with medications as directed.  Please follow up with pulmonology office for hospital follow up within 2-3 weeks of discharge - you need pulmonary function tests (PFTs) and further follow up.  Please call the office for an appointment. Your shortness of breath has improved. Your chest x-ray was clear.   Please continue with medications as directed.  Please follow up with pulmonology office for hospital follow up within 2-3 weeks of discharge - you need pulmonary function tests (PFTs) and further follow up.  Please call the office for an appointment.

## 2017-11-11 NOTE — DISCHARGE NOTE ADULT - PATIENT PORTAL LINK FT
“You can access the [facility] Patient Portal, offered by [facility], by registering with the following website: [email or web address]”

## 2017-11-11 NOTE — DISCHARGE NOTE ADULT - HOSPITAL COURSE
Pt c/o SOB/nonproductive cough for the past 10 days. Initially started with subjective fevers, which resolved with OTC NSAID/tylenol. Family has been sick as well. RVP negative now though may have been infected over a week ago. Has been getting steroids as outpatient but inappropriate dosages and being treated as PNA though no radiological evidence of PNA. On labs has mild leukocytosis, which is likely secondary to steroid use, and also found to have CO2 52 with mild hypoxia. Currently improved after nebulizer treatments and solumedrol. No history of asthma, so hesitant to call this asthma exacerbation. Most likely Reactive Airway Disease from probable viral URI 10 days ago with lingering bronchospasms causing wheezing, SOB, and cough.  Tx with steroids, nebs, pulmicort.  Improved.  For outpt follow up for PFTs.    dispo: to home Pt c/o SOB/nonproductive cough for the past 10 days. Initially started with subjective fevers, which resolved with OTC NSAID/tylenol. Family has been sick as well. RVP negative now though may have been infected over a week ago. Has been getting steroids as outpatient but inappropriate dosages and being treated as PNA though no radiological evidence of PNA. On labs has mild leukocytosis, which is likely secondary to steroid use, and also found to have CO2 52 with mild hypoxia. Currently improved after nebulizer treatments and solumedrol. No history of asthma, so hesitant to call this asthma exacerbation. Most likely Reactive Airway Disease from probable viral URI 10 days ago with lingering bronchospasms causing wheezing, SOB, and cough.  Will give trial of PPI x 14 days to see if cough improves sec. to a GERD symptom.   Tx with steroids, nebs, pulmicort.  Improved.  For outpt follow up for PFTs.    dispo: to home

## 2017-11-12 VITALS
RESPIRATION RATE: 18 BRPM | TEMPERATURE: 98 F | HEART RATE: 83 BPM | DIASTOLIC BLOOD PRESSURE: 90 MMHG | SYSTOLIC BLOOD PRESSURE: 138 MMHG | OXYGEN SATURATION: 97 %

## 2017-11-12 PROCEDURE — 99239 HOSP IP/OBS DSCHRG MGMT >30: CPT

## 2017-11-12 RX ORDER — PANTOPRAZOLE SODIUM 20 MG/1
40 TABLET, DELAYED RELEASE ORAL
Qty: 0 | Refills: 0 | Status: DISCONTINUED | OUTPATIENT
Start: 2017-11-12 | End: 2017-11-12

## 2017-11-12 RX ORDER — PANTOPRAZOLE SODIUM 20 MG/1
1 TABLET, DELAYED RELEASE ORAL
Qty: 14 | Refills: 0 | OUTPATIENT
Start: 2017-11-12 | End: 2017-11-26

## 2017-11-12 RX ADMIN — Medication 1 PUFF(S): at 10:28

## 2017-11-12 RX ADMIN — Medication 200 MILLIGRAM(S): at 04:34

## 2017-11-12 RX ADMIN — LORATADINE 10 MILLIGRAM(S): 10 TABLET ORAL at 12:51

## 2017-11-12 RX ADMIN — Medication 100 MILLIGRAM(S): at 05:23

## 2017-11-12 RX ADMIN — Medication 3 MILLILITER(S): at 09:31

## 2017-11-12 RX ADMIN — Medication 40 MILLIGRAM(S): at 05:23

## 2017-11-12 NOTE — PROGRESS NOTE ADULT - ATTENDING COMMENTS
Agree with above. +sick contacts with h/o "asthma" and multiple environmental allergies for which he follows closely with an allergist.   Clinically improved very rapidly with steroids and bronchodilators. PE completely normal.  Will start inhaled corticosteroid to overlap with Prednisone taper. Will need outpatient PFTs and coordination with his allergist for known triggers.  May be ready for discharge later this evening or tomorrow morning.
Agree with above. +sick contacts with h/o "asthma" and multiple environmental allergies for which he follows closely with an allergist.   Clinically improved very rapidly with steroids and bronchodilators. PE completely normal.  Will start inhaled corticosteroid to overlap with Prednisone taper. Will need outpatient PFTs and coordination with his allergist for known triggers.  C/w Pulmicort BID, Albuterol prn. Can take Benadryl at night for a few nights to help suppress cough and allow him to sleep.  Stable for discharge today.

## 2017-11-12 NOTE — PROGRESS NOTE ADULT - RS GEN PE MLT RESP DETAILS PC
breath sounds equal/clear to auscultation bilaterally/airway patent/good air movement/respirations non-labored
good air movement/airway patent/no wheezes/breath sounds equal

## 2017-11-12 NOTE — PROGRESS NOTE ADULT - ASSESSMENT
52M hx environmental allergies (for which he receives injections), Vit D deficiency and herniated disc, who p/w SOB and cough for past 10 days - likely post infectious cough with a component of bronchospasm/reactive airways disease with history of "Asthma" diagnosed more than a decade ago without acute exacerbation until this past 2 weeks.

## 2017-11-12 NOTE — PROGRESS NOTE ADULT - SUBJECTIVE AND OBJECTIVE BOX
CHIEF COMPLAINT:    Interval Events:    REVIEW OF SYSTEMS:  Constitutional:   Eyes:  ENT:  CV:  Resp:  GI:  :  MSK:  Integumentary:  Neurological:  Psychiatric:  Endocrine:  Hematologic/Lymphatic:  Allergic/Immunologic:  [ ] All other systems negative  [ ] Unable to assess ROS because ________    OBJECTIVE:  ICU Vital Signs Last 24 Hrs  T(C): 36.7 (12 Nov 2017 05:00), Max: 36.9 (11 Nov 2017 21:51)  T(F): 98 (12 Nov 2017 05:00), Max: 98.4 (11 Nov 2017 21:51)  HR: 82 (12 Nov 2017 05:00) (72 - 82)  BP: 149/60 (12 Nov 2017 05:00) (100/60 - 149/60)  BP(mean): --  ABP: --  ABP(mean): --  RR: 20 (12 Nov 2017 05:00) (17 - 20)  SpO2: 95% (12 Nov 2017 05:00) (95% - 97%)        CAPILLARY BLOOD GLUCOSE          PHYSICAL EXAM:  General:   HEENT:   Lymph Nodes:  Neck:   Respiratory:   Cardiovascular:   Abdomen:   Extremities:   Skin:   Neurological:  Psychiatry:    HOSPITAL MEDICATIONS:  MEDICATIONS  (STANDING):  ALBUTerol/ipratropium for Nebulization 3 milliLiter(s) Nebulizer every 6 hours  buDESOnide   90 MICROgram(s) Inhaler 1 Puff(s) Inhalation two times a day  enoxaparin Injectable 40 milliGRAM(s) SubCutaneous every 24 hours  loratadine 10 milliGRAM(s) Oral daily  predniSONE   Tablet 40 milliGRAM(s) Oral daily    MEDICATIONS  (PRN):  ALBUTerol    90 MICROgram(s) HFA Inhaler 2 Puff(s) Inhalation every 4 hours PRN Shortness of Breath and/or Wheezing  benzonatate 100 milliGRAM(s) Oral three times a day PRN Cough  guaiFENesin    Syrup 200 milliGRAM(s) Oral every 6 hours PRN Cough      LABS:                        13.2   14.81 )-----------( 260      ( 11 Nov 2017 05:40 )             39.5     11-11    140  |  103  |  17  ----------------------------<  113<H>  3.6   |  27  |  0.91    Ca    8.9      11 Nov 2017 05:40                MICROBIOLOGY:     RADIOLOGY:  [ ] Reviewed and interpreted by me    PULMONARY FUNCTION TESTS:    EKG: CHIEF COMPLAINT: Patient is a 52y old  Male who presents with a chief complaint of SOB, cough (11 Nov 2017 12:03)      Interval Events: Coughing episode     REVIEW OF SYSTEMS:  Constitutional: No fever or chills   Eyes:  ENT:  CV: no chest pain   Resp: + dry cough   [x ] All other systems negative  [ ] Unable to assess ROS because ________    OBJECTIVE:  ICU Vital Signs Last 24 Hrs  T(C): 36.7 (12 Nov 2017 05:00), Max: 36.9 (11 Nov 2017 21:51)  T(F): 98 (12 Nov 2017 05:00), Max: 98.4 (11 Nov 2017 21:51)  HR: 82 (12 Nov 2017 05:00) (72 - 82)  BP: 149/60 (12 Nov 2017 05:00) (100/60 - 149/60)  BP(mean): --  ABP: --  ABP(mean): --  RR: 20 (12 Nov 2017 05:00) (17 - 20)  SpO2: 95% (12 Nov 2017 05:00) (95% - 97%)        CAPILLARY BLOOD GLUCOSE        HOSPITAL MEDICATIONS:  MEDICATIONS  (STANDING):  ALBUTerol/ipratropium for Nebulization 3 milliLiter(s) Nebulizer every 6 hours  buDESOnide   90 MICROgram(s) Inhaler 1 Puff(s) Inhalation two times a day  enoxaparin Injectable 40 milliGRAM(s) SubCutaneous every 24 hours  loratadine 10 milliGRAM(s) Oral daily  predniSONE   Tablet 40 milliGRAM(s) Oral daily    MEDICATIONS  (PRN):  ALBUTerol    90 MICROgram(s) HFA Inhaler 2 Puff(s) Inhalation every 4 hours PRN Shortness of Breath and/or Wheezing  benzonatate 100 milliGRAM(s) Oral three times a day PRN Cough  guaiFENesin    Syrup 200 milliGRAM(s) Oral every 6 hours PRN Cough      LABS:                        13.2   14.81 )-----------( 260      ( 11 Nov 2017 05:40 )             39.5     11-11    140  |  103  |  17  ----------------------------<  113<H>  3.6   |  27  |  0.91    Ca    8.9      11 Nov 2017 05:40                MICROBIOLOGY:     RADIOLOGY:  [ ] Reviewed and interpreted by me    PULMONARY FUNCTION TESTS:    EKG: CHIEF COMPLAINT: Patient is a 52y old  Male who presents with a chief complaint of SOB, cough (11 Nov 2017 12:03)      Interval Events: Coughing episode x1 oduring night     REVIEW OF SYSTEMS:  Constitutional: No fever or chills   Eyes:  ENT:  CV: no chest pain   Resp: + dry cough worse laying flat   [x ] All other systems negative  [ ] Unable to assess ROS because ________    OBJECTIVE:  ICU Vital Signs Last 24 Hrs  T(C): 36.7 (12 Nov 2017 05:00), Max: 36.9 (11 Nov 2017 21:51)  T(F): 98 (12 Nov 2017 05:00), Max: 98.4 (11 Nov 2017 21:51)  HR: 82 (12 Nov 2017 05:00) (72 - 82)  BP: 149/60 (12 Nov 2017 05:00) (100/60 - 149/60)  BP(mean): --  ABP: --  ABP(mean): --  RR: 20 (12 Nov 2017 05:00) (17 - 20)  SpO2: 95% (12 Nov 2017 05:00) (95% - 97%)        CAPILLARY BLOOD GLUCOSE        HOSPITAL MEDICATIONS:  MEDICATIONS  (STANDING):  ALBUTerol/ipratropium for Nebulization 3 milliLiter(s) Nebulizer every 6 hours  buDESOnide   90 MICROgram(s) Inhaler 1 Puff(s) Inhalation two times a day  enoxaparin Injectable 40 milliGRAM(s) SubCutaneous every 24 hours  loratadine 10 milliGRAM(s) Oral daily  predniSONE   Tablet 40 milliGRAM(s) Oral daily    MEDICATIONS  (PRN):  ALBUTerol    90 MICROgram(s) HFA Inhaler 2 Puff(s) Inhalation every 4 hours PRN Shortness of Breath and/or Wheezing  benzonatate 100 milliGRAM(s) Oral three times a day PRN Cough  guaiFENesin    Syrup 200 milliGRAM(s) Oral every 6 hours PRN Cough      LABS:                        13.2   14.81 )-----------( 260      ( 11 Nov 2017 05:40 )             39.5     11-11    140  |  103  |  17  ----------------------------<  113<H>  3.6   |  27  |  0.91    Ca    8.9      11 Nov 2017 05:40                MICROBIOLOGY:     RADIOLOGY:  [ ] Reviewed and interpreted by me    PULMONARY FUNCTION TESTS:    EKG:

## 2017-11-12 NOTE — PROGRESS NOTE ADULT - PROBLEM SELECTOR PLAN 1
- likely 2/2 reactive airway disease, pt without PFTs to confirm dx of asthma  - off oxygen at this time and tolerating room air  - would continue with albuterol  - cont with claritin  - cont pred, with short taper upon discharge  - add pulmicort  - will need outpt follow up for PFTs   - monitor respiratory status  -tessalon pearles for cough - likely 2/2 reactive airway disease, pt without PFTs to confirm dx of asthma  - off oxygen at this time and tolerating room air  - would continue with albuterol  - cont with claritin  - cont pred, with short taper upon discharge  - add pulmicort  - will need outpt follow up for PFTs with Dr. Lisker   - monitor respiratory status  -tessalon pearles for cough  - start omeprazole for 14 day course  - improved for d/c home

## 2017-11-12 NOTE — PROGRESS NOTE ADULT - NEUROLOGICAL DETAILS
responds to pain/responds to verbal commands/alert and oriented x 3
responds to verbal commands/responds to pain/alert and oriented x 3

## 2017-11-13 RX ORDER — CHOLECALCIFEROL (VITAMIN D3) 125 MCG
1 CAPSULE ORAL
Qty: 0 | Refills: 0 | COMMUNITY

## 2017-11-13 RX ORDER — UBIDECARENONE 100 MG
0 CAPSULE ORAL
Qty: 0 | Refills: 0 | COMMUNITY

## 2017-11-13 RX ORDER — ALBUTEROL 90 UG/1
2 AEROSOL, METERED ORAL
Qty: 0 | Refills: 0 | COMMUNITY

## 2017-11-20 ENCOUNTER — APPOINTMENT (OUTPATIENT)
Dept: PULMONOLOGY | Facility: CLINIC | Age: 52
End: 2017-11-20

## 2017-11-20 PROBLEM — J30.2 OTHER SEASONAL ALLERGIC RHINITIS: Chronic | Status: ACTIVE | Noted: 2017-11-10

## 2017-12-27 ENCOUNTER — APPOINTMENT (OUTPATIENT)
Dept: PULMONOLOGY | Facility: CLINIC | Age: 52
End: 2017-12-27
Payer: COMMERCIAL

## 2017-12-27 VITALS
HEIGHT: 65 IN | SYSTOLIC BLOOD PRESSURE: 134 MMHG | RESPIRATION RATE: 15 BRPM | WEIGHT: 152 LBS | BODY MASS INDEX: 25.33 KG/M2 | DIASTOLIC BLOOD PRESSURE: 90 MMHG | HEART RATE: 70 BPM | TEMPERATURE: 99.1 F

## 2017-12-27 PROCEDURE — 94729 DIFFUSING CAPACITY: CPT

## 2017-12-27 PROCEDURE — 94060 EVALUATION OF WHEEZING: CPT

## 2017-12-27 PROCEDURE — 94726 PLETHYSMOGRAPHY LUNG VOLUMES: CPT

## 2017-12-27 PROCEDURE — ZZZZZ: CPT

## 2017-12-27 PROCEDURE — 99203 OFFICE O/P NEW LOW 30 MIN: CPT | Mod: 25

## 2017-12-27 RX ORDER — BUDESONIDE 180 UG/1
180 AEROSOL, POWDER RESPIRATORY (INHALATION)
Refills: 0 | Status: ACTIVE | COMMUNITY

## 2017-12-27 RX ORDER — ALBUTEROL SULFATE 2.5 MG/3ML
(2.5 MG/3ML) VIAL, NEBULIZER (ML) INHALATION
Refills: 0 | Status: ACTIVE | COMMUNITY

## 2017-12-27 RX ORDER — LORATADINE 10 MG
5-120 TABLET ORAL
Refills: 0 | Status: ACTIVE | COMMUNITY

## 2018-08-26 ENCOUNTER — EMERGENCY (EMERGENCY)
Facility: HOSPITAL | Age: 53
LOS: 1 days | Discharge: HOME | End: 2018-08-26
Attending: EMERGENCY MEDICINE
Payer: COMMERCIAL

## 2018-08-26 VITALS
HEIGHT: 65 IN | DIASTOLIC BLOOD PRESSURE: 99 MMHG | SYSTOLIC BLOOD PRESSURE: 171 MMHG | OXYGEN SATURATION: 98 % | WEIGHT: 147.93 LBS | RESPIRATION RATE: 16 BRPM | HEART RATE: 65 BPM | TEMPERATURE: 98 F

## 2018-08-26 LAB
ALBUMIN SERPL ELPH-MCNC: 4.2 G/DL — SIGNIFICANT CHANGE UP (ref 3.3–5)
ALP SERPL-CCNC: 99 U/L — SIGNIFICANT CHANGE UP (ref 40–120)
ALT FLD-CCNC: 31 U/L — SIGNIFICANT CHANGE UP (ref 10–45)
ANION GAP SERPL CALC-SCNC: 11 MMOL/L — SIGNIFICANT CHANGE UP (ref 5–17)
APTT BLD: 33.2 SEC — SIGNIFICANT CHANGE UP (ref 27.5–37.4)
AST SERPL-CCNC: 26 U/L — SIGNIFICANT CHANGE UP (ref 10–40)
BASOPHILS # BLD AUTO: 0.1 K/UL — SIGNIFICANT CHANGE UP (ref 0–0.2)
BASOPHILS NFR BLD AUTO: 0.8 % — SIGNIFICANT CHANGE UP (ref 0–2)
BILIRUB SERPL-MCNC: 0.2 MG/DL — SIGNIFICANT CHANGE UP (ref 0.2–1.2)
BUN SERPL-MCNC: 16 MG/DL — SIGNIFICANT CHANGE UP (ref 7–23)
CALCIUM SERPL-MCNC: 9.4 MG/DL — SIGNIFICANT CHANGE UP (ref 8.4–10.5)
CHLORIDE SERPL-SCNC: 104 MMOL/L — SIGNIFICANT CHANGE UP (ref 96–108)
CO2 SERPL-SCNC: 29 MMOL/L — SIGNIFICANT CHANGE UP (ref 22–31)
CREAT SERPL-MCNC: 1.23 MG/DL — SIGNIFICANT CHANGE UP (ref 0.5–1.3)
EOSINOPHIL # BLD AUTO: 0.2 K/UL — SIGNIFICANT CHANGE UP (ref 0–0.5)
EOSINOPHIL NFR BLD AUTO: 3.2 % — SIGNIFICANT CHANGE UP (ref 0–6)
GLUCOSE SERPL-MCNC: 110 MG/DL — HIGH (ref 70–99)
HCT VFR BLD CALC: 44 % — SIGNIFICANT CHANGE UP (ref 39–50)
HGB BLD-MCNC: 14.6 G/DL — SIGNIFICANT CHANGE UP (ref 13–17)
INR BLD: 0.97 RATIO — SIGNIFICANT CHANGE UP (ref 0.88–1.16)
LYMPHOCYTES # BLD AUTO: 2.4 K/UL — SIGNIFICANT CHANGE UP (ref 1–3.3)
LYMPHOCYTES # BLD AUTO: 34.3 % — SIGNIFICANT CHANGE UP (ref 13–44)
MCHC RBC-ENTMCNC: 29.2 PG — SIGNIFICANT CHANGE UP (ref 27–34)
MCHC RBC-ENTMCNC: 33.1 GM/DL — SIGNIFICANT CHANGE UP (ref 32–36)
MCV RBC AUTO: 88.3 FL — SIGNIFICANT CHANGE UP (ref 80–100)
MONOCYTES # BLD AUTO: 0.7 K/UL — SIGNIFICANT CHANGE UP (ref 0–0.9)
MONOCYTES NFR BLD AUTO: 10 % — SIGNIFICANT CHANGE UP (ref 2–14)
NEUTROPHILS # BLD AUTO: 3.6 K/UL — SIGNIFICANT CHANGE UP (ref 1.8–7.4)
NEUTROPHILS NFR BLD AUTO: 51.7 % — SIGNIFICANT CHANGE UP (ref 43–77)
PLATELET # BLD AUTO: 227 K/UL — SIGNIFICANT CHANGE UP (ref 150–400)
POTASSIUM SERPL-MCNC: 3.5 MMOL/L — SIGNIFICANT CHANGE UP (ref 3.5–5.3)
POTASSIUM SERPL-SCNC: 3.5 MMOL/L — SIGNIFICANT CHANGE UP (ref 3.5–5.3)
PROT SERPL-MCNC: 7.2 G/DL — SIGNIFICANT CHANGE UP (ref 6–8.3)
PROTHROM AB SERPL-ACNC: 10.6 SEC — SIGNIFICANT CHANGE UP (ref 9.8–12.7)
RBC # BLD: 4.99 M/UL — SIGNIFICANT CHANGE UP (ref 4.2–5.8)
RBC # FLD: 12.9 % — SIGNIFICANT CHANGE UP (ref 10.3–14.5)
SODIUM SERPL-SCNC: 144 MMOL/L — SIGNIFICANT CHANGE UP (ref 135–145)
TROPONIN T, HIGH SENSITIVITY RESULT: <6 NG/L — SIGNIFICANT CHANGE UP (ref 0–51)
WBC # BLD: 6.9 K/UL — SIGNIFICANT CHANGE UP (ref 3.8–10.5)
WBC # FLD AUTO: 6.9 K/UL — SIGNIFICANT CHANGE UP (ref 3.8–10.5)

## 2018-08-26 PROCEDURE — 71045 X-RAY EXAM CHEST 1 VIEW: CPT | Mod: 26

## 2018-08-26 PROCEDURE — 85610 PROTHROMBIN TIME: CPT

## 2018-08-26 PROCEDURE — 80053 COMPREHEN METABOLIC PANEL: CPT

## 2018-08-26 PROCEDURE — 99285 EMERGENCY DEPT VISIT HI MDM: CPT | Mod: 25

## 2018-08-26 PROCEDURE — 71045 X-RAY EXAM CHEST 1 VIEW: CPT

## 2018-08-26 PROCEDURE — 85730 THROMBOPLASTIN TIME PARTIAL: CPT

## 2018-08-26 PROCEDURE — 99284 EMERGENCY DEPT VISIT MOD MDM: CPT | Mod: 25

## 2018-08-26 PROCEDURE — 84484 ASSAY OF TROPONIN QUANT: CPT

## 2018-08-26 PROCEDURE — 85027 COMPLETE CBC AUTOMATED: CPT

## 2018-08-26 RX ORDER — ASPIRIN/CALCIUM CARB/MAGNESIUM 324 MG
162 TABLET ORAL ONCE
Qty: 0 | Refills: 0 | Status: COMPLETED | OUTPATIENT
Start: 2018-08-26 | End: 2018-08-26

## 2018-08-26 RX ADMIN — Medication 162 MILLIGRAM(S): at 23:07

## 2018-08-26 NOTE — ED ADULT NURSE NOTE - CHPI ED NUR SYMPTOMS NEG
no fever/no syncope/no congestion/no dizziness/no shortness of breath/no vomiting/no back pain/no diaphoresis/no nausea/no chills

## 2018-08-26 NOTE — ED ADULT NURSE NOTE - NSIMPLEMENTINTERV_GEN_ALL_ED
Implemented All Universal Safety Interventions:  [place] to call system. Call bell, personal items and telephone within reach. Instruct patient to call for assistance. Room bathroom lighting operational. Non-slip footwear when patient is off stretcher. Physically safe environment: no spills, clutter or unnecessary equipment. Stretcher in lowest position, wheels locked, appropriate side rails in place.

## 2018-08-26 NOTE — ED ADULT NURSE NOTE - OBJECTIVE STATEMENT
54 y/o male presenting to ED c/o chest pain x 3 days. Pt states he has significant family history for heart disease. Pt. denies radiation of chest pain, sob, ha, n/v/d, abdominal pain, f/c, urinary symptoms, hematuria, numbness, tingling, weakness, dizziness, blurred vision. A&Ox4 gross neuro intact, lungs cta bilaterally, no difficulty speaking in complete sentences, s1s2 heart sounds heard, pulses x 4, navarrete x4, abdomen soft nontender nondistended, skin intact. Sinus Rhythm on EKG. Continuous cardiac monitoring maintained. Safety and comfort measures maintained. Patient undressed and placed into gown, call bell in hand and side rails up for safety. warm blanket provided, vital signs stable, pt in no acute distress.

## 2018-08-26 NOTE — ED PROVIDER NOTE - ATTENDING CONTRIBUTION TO CARE
Caroline Loza MD - Attending Physician: I have personally seen and examined this patient with the resident/fellow.  I have fully participated in the care of this patient. I have reviewed all pertinent clinical information, including history, physical exam, plan and the Resident/Fellow’s note and agree except as noted. See MDM

## 2018-08-26 NOTE — ED PROVIDER NOTE - OBJECTIVE STATEMENT
53 Y M with no pmhx who presents with 3 days of off and on L sided chest pain. Pt states that he has mild pain in anterior L chest that he was worried could be his heart. He has no associated N/V, diaphoresis, SOB. Pain isn't made worse with exercise or activity. He went to urgent care and was told that he needed further evaluation in the ED. He has a personal hx of smoking but he quit 20 years ago, he also has a family hx of cardiac disease. The patient had a negative stress 10 years ago. He exercises 2-3 times a week without any chest pain, SOB.

## 2018-08-26 NOTE — ED PROVIDER NOTE - MEDICAL DECISION MAKING DETAILS
53 Y M with CP non-exertional with no associated symptoms x 3 days. Will get troponin, EKG, CXR, basic labs will likely DC after negative troponin. 53 Y M with CP non-exertional with no associated symptoms x 3 days. Will get troponin, EKG, CXR, basic labs will likely DC after negative troponin.    Caroline Loza MD - Attending Physician: Pt here with atypical chest pain, low risk. Labs, Xr for eval

## 2018-08-26 NOTE — ED PROVIDER NOTE - CARE PLAN
Principal Discharge DX:	Chest pain  Assessment and plan of treatment:	We were unable to find an exact cause of your chest pain. Your cardiac enzymes were negative and your EKG did not show any significant findings that were concerning for a heart attack right now. It is possible that these symptoms are still do to your heart and you need to follow up with your cardiologist as soon as possible. If you have any new or concerning symptoms please come right back to the emergency room. These include increased SOB, fatigue, fever, or new chest pain.

## 2018-08-27 VITALS
HEART RATE: 61 BPM | TEMPERATURE: 98 F | OXYGEN SATURATION: 99 % | DIASTOLIC BLOOD PRESSURE: 95 MMHG | SYSTOLIC BLOOD PRESSURE: 148 MMHG | RESPIRATION RATE: 17 BRPM

## 2018-08-27 PROBLEM — M51.26 OTHER INTERVERTEBRAL DISC DISPLACEMENT, LUMBAR REGION: Chronic | Status: ACTIVE | Noted: 2017-11-10

## 2018-08-27 PROBLEM — E55.9 VITAMIN D DEFICIENCY, UNSPECIFIED: Chronic | Status: ACTIVE | Noted: 2017-11-10

## 2018-08-29 ENCOUNTER — TRANSCRIPTION ENCOUNTER (OUTPATIENT)
Age: 53
End: 2018-08-29

## 2019-07-23 NOTE — PATIENT PROFILE ADULT. - TEACHING/LEARNING LEARNING PREFERENCES
Alert-The patient is alert, awake and responds to voice. The patient is oriented to time, place, and person. The triage nurse is able to obtain subjective information.
verbal instruction/written material

## 2020-02-13 ENCOUNTER — APPOINTMENT (OUTPATIENT)
Dept: ELECTROPHYSIOLOGY | Facility: CLINIC | Age: 55
End: 2020-02-13
Payer: COMMERCIAL

## 2020-02-13 ENCOUNTER — NON-APPOINTMENT (OUTPATIENT)
Age: 55
End: 2020-02-13

## 2020-02-13 VITALS
SYSTOLIC BLOOD PRESSURE: 136 MMHG | WEIGHT: 147 LBS | HEART RATE: 56 BPM | OXYGEN SATURATION: 100 % | DIASTOLIC BLOOD PRESSURE: 84 MMHG | RESPIRATION RATE: 16 BRPM | BODY MASS INDEX: 24.49 KG/M2 | HEIGHT: 65 IN

## 2020-02-13 DIAGNOSIS — I48.91 UNSPECIFIED ATRIAL FIBRILLATION: ICD-10-CM

## 2020-02-13 DIAGNOSIS — I48.0 PAROXYSMAL ATRIAL FIBRILLATION: ICD-10-CM

## 2020-02-13 DIAGNOSIS — R00.2 PALPITATIONS: ICD-10-CM

## 2020-02-13 PROCEDURE — 93000 ELECTROCARDIOGRAM COMPLETE: CPT

## 2020-02-13 PROCEDURE — 99204 OFFICE O/P NEW MOD 45 MIN: CPT

## 2020-02-13 RX ORDER — METOPROLOL SUCCINATE 25 MG/1
25 TABLET, EXTENDED RELEASE ORAL DAILY
Refills: 0 | Status: ACTIVE | COMMUNITY

## 2020-02-13 RX ORDER — APIXABAN 5 MG/1
5 TABLET, FILM COATED ORAL TWICE DAILY
Refills: 0 | Status: ACTIVE | COMMUNITY

## 2020-02-13 NOTE — PHYSICAL EXAM
[Normal Appearance] : normal appearance [General Appearance - Well Developed] : well developed [Well Groomed] : well groomed [General Appearance - Well Nourished] : well nourished [General Appearance - In No Acute Distress] : no acute distress [No Deformities] : no deformities [Normal Conjunctiva] : the conjunctiva exhibited no abnormalities [Eyelids - No Xanthelasma] : the eyelids demonstrated no xanthelasmas [Normal Oral Mucosa] : normal oral mucosa [No Oral Pallor] : no oral pallor [No Oral Cyanosis] : no oral cyanosis [Normal Jugular Venous A Waves Present] : normal jugular venous A waves present [Normal Jugular Venous V Waves Present] : normal jugular venous V waves present [No Jugular Venous Rosas A Waves] : no jugular venous rosas A waves [Heart Rate And Rhythm] : heart rate and rhythm were normal [Heart Sounds] : normal S1 and S2 [Murmurs] : no murmurs present [Exaggerated Use Of Accessory Muscles For Inspiration] : no accessory muscle use [Respiration, Rhythm And Depth] : normal respiratory rhythm and effort [Auscultation Breath Sounds / Voice Sounds] : lungs were clear to auscultation bilaterally [Abdomen Soft] : soft [Abdomen Tenderness] : non-tender [Abdomen Mass (___ Cm)] : no abdominal mass palpated [Abnormal Walk] : normal gait [Nail Clubbing] : no clubbing of the fingernails [Gait - Sufficient For Exercise Testing] : the gait was sufficient for exercise testing [Cyanosis, Localized] : no localized cyanosis [Petechial Hemorrhages (___cm)] : no petechial hemorrhages [Skin Color & Pigmentation] : normal skin color and pigmentation [No Venous Stasis] : no venous stasis [] : no rash [Skin Lesions] : no skin lesions [No Skin Ulcers] : no skin ulcer [No Xanthoma] : no  xanthoma was observed [Affect] : the affect was normal [Oriented To Time, Place, And Person] : oriented to person, place, and time [Mood] : the mood was normal [No Anxiety] : not feeling anxious

## 2020-02-23 NOTE — HISTORY OF PRESENT ILLNESS
[FreeTextEntry1] : Truman Pierce MD\par \par Scooter Michelle is a 53y/o man with Hx of paroxysmal afib who presents today for initial evaluation. Admits recurring episodes of palpitations which seem to occur suddenly and associated with afib. Episodes can occur at night and awake him from sleep. Last episode occurred in the middle of the night with palpitations. No known relieving or aggravating factors. Presented to an Urgent Center but by the time he had been seen he had converted back to NSR. Remains on Eliquis and metoprolol. Denies chest pain,  SOB, syncope or near syncope.

## 2020-02-23 NOTE — DISCUSSION/SUMMARY
[FreeTextEntry1] : Scooter Michelle is a 55y/o man with Hx of paroxysmal afib who presents today for initial evaluation. \par \par Impression:\par \par 1. Paroxysmal afib: EKG today NSR. Given recurrent symptomatic afib despite rate control strategy, discussed possibility of antiarrhythmics vs PVI ablation. Given young age, prefers to avoid use of antiarrhythmics. Recommend undergoing PVI ablation. Risks, benefits, and alternatives to procedure discussed at length. Risks including that of bleeding, infection, stroke, and cardiac tamponade discussed and he verbalizes understanding of all. He wants to consider in future. \par \par

## 2020-02-26 NOTE — ED ADULT NURSE NOTE - NS ED NURSE RECORD ANOTHER VITAL SIGN
Chief Complaint   Patient presents with    Hypertension    Hyperlipidemia    Diabetes    Joint Pain     from his hips down x 3-4 weeks     Back Pain    Neck Pain       Patient is here to follow-up on chronic  medical problems. Comes with his son    Diabetes Mellitus Type II, Follow-up:    Current symptoms/problems include hyperglycemia, paresthesia of the feet and visual disturbances. Symptoms have been present for several years. Known diabetic complications: nephropathy and peripheral neuropathy    Cardiovascular risk factors: advanced age (older than 54 for men, 72 for women), diabetes mellitus, dyslipidemia, hypertension, male gender, microalbuminuria and sedentary lifestyle    Medication compliance:  compliant all of the time  Current diabetic medications include oral agent (monotherapy): Glucophage XR. Eye exam current (within one year): yes  Weight trend: stable  Wt Readings from Last 3 Encounters:   02/26/20 174 lb (78.9 kg)   02/20/20 174 lb (78.9 kg)   11/15/19 174 lb (78.9 kg)       Prior visit with dietician: no  Current diet: in general, a \"healthy\" diet    Current exercise: none    Barriers: impairment:  physical:  Chronic pain, lack of motivation    Current monitoring regimen: home blood tests - daily  Home blood sugar records: fasting range: 148, 125, 159, 140, 151  Any episodes of hypoglycemia? no    Is He on ACE inhibitor or angiotensin II receptor blocker? Yes      reports that he quit smoking about 52 years ago. He has never used smokeless tobacco.     Counseling given: Yes      Daily Aspirin? No: Due to anemia, risks are higher      A1c is worsening  Lab Results   Component Value Date    LABA1C 7.0 02/26/2020    LABA1C 6.8 11/15/2019    LABA1C 6.5 08/02/2019       Urine microabumin is Elevated.   Urine microalbumin was 80 on 11/15/2019, improved  Lab Results   Component Value Date    LABMICR 106 (H) 11/15/2017          Hypertension:   Lisa Sims  is not  exercising and is adherent to heat intolerance, polydipsia, polyphagia and polyuria. Genitourinary: Positive for difficulty urinating (better with Flomax). Negative for dysuria and frequency. Nocturia twice   Musculoskeletal: Positive for arthralgias (right knee, right ankle, right hip), back pain and neck pain. Neurological: Positive for numbness (feet). Negative for weakness. Hematological: Does not bruise/bleed easily. Psychiatric/Behavioral: Positive for sleep disturbance. Negative for dysphoric mood. The patient is not nervous/anxious.          -vital signs stable and within normal limits   /60   Pulse 83   Ht 5' 10\" (1.778 m)   Wt 174 lb (78.9 kg)   SpO2 98%   BMI 24.97 kg/m²         Physical Exam  Vitals signs and nursing note reviewed. Constitutional:       General: He is not in acute distress. Appearance: Normal appearance. He is well-developed and normal weight. He is not diaphoretic. HENT:      Head: Normocephalic and atraumatic. Right Ear: Hearing, tympanic membrane, ear canal and external ear normal.      Left Ear: Hearing, tympanic membrane, ear canal and external ear normal.      Nose: Nose normal. No mucosal edema. Mouth/Throat:      Mouth: Mucous membranes are moist.      Pharynx: No oropharyngeal exudate or posterior oropharyngeal erythema. Eyes:      General: Lids are normal. No scleral icterus. Right eye: No discharge. Left eye: No discharge. Conjunctiva/sclera: Conjunctivae normal.   Neck:      Musculoskeletal: Normal range of motion and neck supple. Thyroid: No thyromegaly. Cardiovascular:      Rate and Rhythm: Normal rate and regular rhythm. Heart sounds: Murmur present. Crescendo  systolic murmur present with a grade of 4/6. Pulmonary:      Effort: Pulmonary effort is normal. No respiratory distress. Breath sounds: Normal breath sounds. No wheezing or rales. Chest:      Chest wall: No tenderness.    Abdominal:      General: Bowel sounds are normal. There is no distension. Palpations: Abdomen is soft. Abdomen is not rigid. There is no hepatomegaly or splenomegaly. Tenderness: There is no abdominal tenderness. There is no guarding. Musculoskeletal:         General: Tenderness present. Right hip: He exhibits tenderness. Right knee: He exhibits normal range of motion. Tenderness found. Patellar tendon tenderness noted. Cervical back: He exhibits decreased range of motion, tenderness and pain. Lumbar back: He exhibits decreased range of motion and tenderness. Right lower leg: No edema. Left lower leg: No edema. Comments: Up and go test more than 12 seconds. High risk for falls. He declines PT   Skin:     General: Skin is warm and dry. Capillary Refill: Capillary refill takes less than 2 seconds. Findings: No rash. Neurological:      Mental Status: He is alert and oriented to person, place, and time. Cranial Nerves: No cranial nerve deficit. Motor: No abnormal muscle tone. Coordination: Coordination normal.      Deep Tendon Reflexes: Reflexes normal.   Psychiatric:         Mood and Affect: Mood is anxious. Behavior: Behavior normal.         Thought Content: Thought content normal.         Judgment: Judgment normal.           Discussed testing withthe patient and all questions fully answered. I personally reviewed testing with patient.   Hyperglycemia controlled  Mild increased BUN, likely chronic kidney disease stage II  Anemia chronic, stable      Otherwise labs within normal limits    Hospital Outpatient Visit on 02/19/2020   Component Date Value Ref Range Status    Glucose 02/19/2020 158* 70 - 99 mg/dL Final    BUN 02/19/2020 24* 8 - 23 mg/dL Final    CREATININE 02/19/2020 1.00  0.70 - 1.20 mg/dL Final    Bun/Cre Ratio 02/19/2020 NOT REPORTED  9 - 20 Final    Calcium 02/19/2020 9.6  8.6 - 10.4 mg/dL Final    Sodium 02/19/2020 142  135 - 144 mmol/L Final    HDL 51 10/25/2018    HDL 34 (L) 05/19/2017     Lab Results   Component Value Date    LDLCHOLESTEROL 69 11/08/2019    LDLCHOLESTEROL 60 10/25/2018    LDLCHOLESTEROL 76 05/19/2017     Lab Results   Component Value Date    CHOLHDLRATIO 2.7 11/08/2019    CHOLHDLRATIO 2.4 10/25/2018    CHOLHDLRATIO 3.7 05/19/2017         Lab Results   Component Value Date    BVUWWJQH42 430 11/08/2019     Lab Results   Component Value Date    FOLATE >20.0 11/08/2019     Lab Results   Component Value Date    VITD25 33.2 10/25/2018           ASSESSMENT AND PLAN    1. Type 2 diabetes mellitus with diabetic polyneuropathy, without long-term current use of insulin (HCC)  Worsening, but still very well controlled  - POCT glycosylated hemoglobin (Hb A1C)  Lab Results   Component Value Date    LABA1C 7.0 02/26/2020    LABA1C 6.8 11/15/2019    LABA1C 6.5 08/02/2019       - lidocaine (LIDODERM) 5 %; Place 1 patch onto the skin daily 12 hours on, 12 hours off. Dispense: 30 patch; Refill: 3  - metFORMIN (GLUCOPHAGE-XR) 500 MG extended release tablet; Take 1 tablet by mouth daily (with breakfast)  Dispense: 90 tablet; Refill: 0  - CBC Auto Differential; Future  - Comprehensive Metabolic Panel; Future  - TSH without Reflex; Future  - Vitamin B12 & Folate; Future    -advised home blood glucose testing  daily  -daily feet exam, Foot care: advised to wash feet daily, pat dry and apply lotion at night, avoiding between toes. Need to look at feet daily and report to a physician any signs of inflammation or skin damage  -annual dilated eye exam  -Low carb, low fat diet, increase fruits and vegetables, and exercise 4-5 times a day 30-40 minutes a day discussed  -continue current treatment  -No aspirin, risks are higher than benefits  -continue ACEI and statin      2. Essential hypertension  Well controlled. Continue current treatment. Will recheck labs. - CBC Auto Differential; Future  - Comprehensive Metabolic Panel;  Future  - TSH without Reflex; Future  Discussed low salt diet and BP and pulse monitoring daily, BP log given    3. Hyperlipidemia with target LDL less than 70  Well-controlled  Continue statin, Lipitor 20 mg daily    4. Chronic neck pain  Failing to change as expected. - camphor-menthol-methyl salicylate (BENGAY ULTRA STRENGTH) 4-10-30 % CREA cream; Apply topically 3 times daily as needed for Pain  Dispense: 113 g; Refill: 0  - lidocaine (LIDODERM) 5 %; Place 1 patch onto the skin daily 12 hours on, 12 hours off. Dispense: 30 patch; Refill: 3  Declines physical therapy  Heating pad, stretching    5. Chronic right-sided low back pain with right-sided sciatica  Failing to change as expected. Declines physical therapy  Heating pad, stretching  - camphor-menthol-methyl salicylate (BENGAY ULTRA STRENGTH) 4-10-30 % CREA cream; Apply topically 3 times daily as needed for Pain  Dispense: 113 g; Refill: 0  - lidocaine (LIDODERM) 5 %; Place 1 patch onto the skin daily 12 hours on, 12 hours off. Dispense: 30 patch; Refill: 3    6. Primary osteoarthritis of right knee  Declines physical therapy  Heating pad, home exercising, falls precautions discussed  - camphor-menthol-methyl salicylate (BENGAY ULTRA STRENGTH) 4-10-30 % CREA cream; Apply topically 3 times daily as needed for Pain  Dispense: 113 g; Refill: 0  - lidocaine (LIDODERM) 5 %; Place 1 patch onto the skin daily 12 hours on, 12 hours off. Dispense: 30 patch; Refill: 3    7. Pulmonary hypertension ([address]  Stable  We do not find necessary to repeat echo 2D due to cost, patient does not want to pursue any cardiac or pulmonary investigations anymore  We will continue with observation    8. Encounter for screening for other viral diseases  - Hepatitis B Surface Antibody;  Future        Data Unavailable    Orders Placed This Encounter   Procedures    Hepatitis B Surface Antibody     Standing Status:   Future     Standing Expiration Date:   2/26/2021    CBC Auto Differential     Standing Status: 120/60. Blood pressure is normal. Treatment plan consists of DASH Eating Plan, Dietary Sodium Restriction, Increased Physical Activity, Patient In-home Blood Pressure Monitoring and No treatment change needed. Fall Risk 4/26/2019 1/23/2019 1/19/2018 1/5/2017   2 or more falls in past year? no no no no   Fall with injury in past year? no no no no     The patient does not have a history of falls. I did , complete a risk assessment for falls. A plan of care for falls in-office gait and balance testing performed using The Timed Up and Go Test was positive for increased falls risk, home safety tips provided, patient declines any further evaluation/treatment for increased falls risk    Negative depression screening  PHQ Scores 2/26/2020 4/26/2019 7/20/2018 1/19/2018 1/5/2017   PHQ2 Score 0 0 0 0 1   PHQ9 Score 0 0 0 0 1           The patient's past medical,surgical, social, and family history as well as his   current medications and allergies were reviewed as documented in today's encounter. Medications, labs, diagnostic studies, consultations and follow-up asdocumented in this encounter. Return in about 3 months (around 5/26/2020) for AWM. Patient was seen with total face to face time of 25 minutes. More than 50% of this visit was counseling and education. Future Appointments   Date Time Provider [place]   5/21/2020  3:00 PM Iesha Rodriguez DPM [address]   6/2/2020  3:00 PM Shandra Troy MD [id number]       This note was completed by using the assistance of a speech-recognition program. However, inadvertent computerized transcription errors may be present. Although every effort was made to ensure accuracy, no guarantees can be provided that every mistake has been identified and corrected by editing .     Electronically signed by Shandra Troy MD on 3/4/2020 at 10:19 PM Yes, record another set of vital signs

## 2021-06-18 NOTE — ED PROVIDER NOTE - NS ED NOTE AC HIGH RISK COUNTRIES
[facility]  [address] [place] [id number]  Phone: [phone]               June 18, 2021    Patient: Leopoldo Leander   YOB: 2008   Date of Visit: 6/18/2021       To Whom It May Concern:    Darrell Stahl was seen and treated in our emergency department on 6/18/2021. He may return to work on June 19, 2021.   Please excuse father from work June 18, 2021      Sincerely,       Sandra Caban MD         Signature:__________________________________
No

## 2022-03-30 ENCOUNTER — TRANSCRIPTION ENCOUNTER (OUTPATIENT)
Age: 57
End: 2022-03-30

## 2022-07-28 NOTE — ED ADULT NURSE NOTE - NSSISCREENINGQ2_ED_A_ED
chlorhexidine/Adherence to aseptic technique: hand hygiene prior to donning barriers (gown, gloves), don cap and mask, sterile drape over patient No

## 2023-08-01 ENCOUNTER — OFFICE (OUTPATIENT)
Facility: LOCATION | Age: 58
Setting detail: OPHTHALMOLOGY
End: 2023-08-01
Payer: COMMERCIAL

## 2023-08-01 DIAGNOSIS — H00.12: ICD-10-CM

## 2023-08-01 DIAGNOSIS — L03.213: ICD-10-CM

## 2023-08-01 PROCEDURE — 92002 INTRM OPH EXAM NEW PATIENT: CPT | Performed by: OPHTHALMOLOGY

## 2023-08-01 ASSESSMENT — VISUAL ACUITY
OD_BCVA: 20/40+2
OS_BCVA: 20/80

## 2023-08-21 ENCOUNTER — OFFICE (OUTPATIENT)
Facility: LOCATION | Age: 58
Setting detail: OPHTHALMOLOGY
End: 2023-08-21
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
## 2023-08-21 PROCEDURE — NO SHOW FE NO SHOW FEE: Performed by: OPHTHALMOLOGY

## 2024-03-26 NOTE — ED ADULT NURSE NOTE - NS PRO PASSIVE SMOKE EXP
Call [facility] clinic: [phone] to schedule an \"establish care\" visit with a new primary provider:  Dr. Cara Isbell.    If Dr. Isbell is not available to take new patients, you can ask for either Dr. Estiven Schwarz or Dr. Iram Farmer.    Referral was placed for Dermatologist Dr. Inman at [facility] for the skin rash.    The itchy rash is likely an allergic immune response to Metformin, and we are treating this with Prednisone steroid in a tapering dose.  Your sugars may run higher on the first 3 to 4 days of the Prednisone; if this happens you can increase your Basaglar long acting insulin dose by 3 to 4 units nightly or call with your sugar readings and we can help you adjust the dose.    You can use the topical steroid Triamcinolone twice daily on the rash to help with the itching.    Call if your rash worsens after finishing the Prednisone and we can prolong the treatment if needed.      
Unknown

## 2025-07-12 ENCOUNTER — NON-APPOINTMENT (OUTPATIENT)
Age: 60
End: 2025-07-12